# Patient Record
Sex: MALE | Race: WHITE
[De-identification: names, ages, dates, MRNs, and addresses within clinical notes are randomized per-mention and may not be internally consistent; named-entity substitution may affect disease eponyms.]

---

## 2018-01-03 ENCOUNTER — HOSPITAL ENCOUNTER (OUTPATIENT)
Dept: HOSPITAL 17 - PHRSP | Age: 68
End: 2018-01-03
Attending: FAMILY MEDICINE
Payer: MEDICARE

## 2018-01-03 DIAGNOSIS — J45.909: Primary | ICD-10-CM

## 2018-01-03 PROCEDURE — 94729 DIFFUSING CAPACITY: CPT

## 2018-01-03 PROCEDURE — 94060 EVALUATION OF WHEEZING: CPT

## 2018-01-05 NOTE — RSPPFT
DATE OF PROCEDURE:   1/3/18



COMMENTS:   



Spirometry shows FVC of 4.3 predicted 5.1, FEV1 of 3.2 predicted 4.0, FEV1/FVC ratio 74% 
predicted 78%. Lung volumes are within the predicted range. DLCO is 72% of predicted but 
within the predicted range when corrected for alveolar volume.  



IMPRESSION:    

   

On the basis of the above, patient has flow values and lung volumes within the predicted 
range. There has not been a significant change when compared to prior  examination on 
December of 2015 when his FVC was 2.4, FEV1 was 3.3, TLC was 6.8.